# Patient Record
Sex: MALE | Race: BLACK OR AFRICAN AMERICAN
[De-identification: names, ages, dates, MRNs, and addresses within clinical notes are randomized per-mention and may not be internally consistent; named-entity substitution may affect disease eponyms.]

---

## 2017-11-07 ENCOUNTER — HOSPITAL ENCOUNTER (OUTPATIENT)
Dept: HOSPITAL 61 - SURG | Age: 52
Discharge: HOME | End: 2017-11-07
Attending: INTERNAL MEDICINE
Payer: COMMERCIAL

## 2017-11-07 VITALS — SYSTOLIC BLOOD PRESSURE: 134 MMHG | DIASTOLIC BLOOD PRESSURE: 71 MMHG

## 2017-11-07 DIAGNOSIS — E78.00: ICD-10-CM

## 2017-11-07 DIAGNOSIS — Z12.11: Primary | ICD-10-CM

## 2017-11-07 DIAGNOSIS — Z86.39: ICD-10-CM

## 2017-11-07 DIAGNOSIS — Z91.018: ICD-10-CM

## 2017-11-07 DIAGNOSIS — I11.0: ICD-10-CM

## 2017-11-07 DIAGNOSIS — I10: ICD-10-CM

## 2017-11-07 DIAGNOSIS — E03.9: ICD-10-CM

## 2017-11-07 DIAGNOSIS — I50.9: ICD-10-CM

## 2017-11-07 DIAGNOSIS — E11.9: ICD-10-CM

## 2017-11-07 PROCEDURE — 45378 DIAGNOSTIC COLONOSCOPY: CPT

## 2017-11-07 NOTE — PDOC1
HISTORY & PHYSICAL


H&P


Pravin Rodriguez Jr [id number] 1965 10/18/2017 03:00 PM 1/1 


 [place] [facility], [facility]


 OUR PATIENTS COME FIRST


 [address] [place] [address]   


 Ph. [phone]     








Patient:      Pravin Rodriguez Jr


YOB: 1965   


Date:                         10/18/2017 3:00 PM 


Visit Type:                 Consult





This 52 year old male presents for Screening colonoscopy.





History of Present Illness:


1.  Screening colonoscopy 


No prior screening.  Denies risk factors.  Pertinent negatives include 

abdominal pain, change in bowel habits, change in stool caliber, constipation, 

decreased appetite, diarrhea, melena, nausea, rectal bleeding, vomiting, weight 

gain and weight loss.  Additional information: No family history of colon cancer

, No family history of Crohn's/colitis and No NSAID/ASA use.





PROBLEM LIST:











Problem Description Onset Date  


 


Full examination performed 04/04/2014  


 


Type II diabetes mellitus well controlled 02/21/2012  


 


Congestive heart failure 10/19/2009  


 


Benign essential hypertension 10/19/2009  


 


Hyperlipidemia 10/19/2009  


 


Conduction disorder of the heart 10/19/2009  


 


Hypothyroidism 04/04/2014  








PAST MEDICAL/SURGICAL HISTORY   (Detailed)











Disease/disorder Onset Date Management Date Comments


 


AICD 2009 cardiomyopathy  


 


Congestive heart failure 2009 AICD placement  


 


Diabetes    


 


hernia surgery 2011   


 


Hyperlipidemia    


 


Hypertension    


 


Sleep apnea 2009 CPAP mask  








Medications (Active):











Started Medication Directions Instruction Stopped


 


09/18/2017 Aldactone 25 mg tablet TAKE 1 TABLET BY MOUTH DAILY  


 


09/18/2017 Coreg 25 mg tablet TAKE 1 TABLET BY MOUTH 2 TIMES A DAY WITH FOOD  


 


09/18/2017 Demadex 20 mg tablet take 1 tablet by oral route  every day  


 


09/18/2017 hydrochlorothiazide 25 mg tablet TAKE 1 TABLET BY MOUTH DAILY  


 


09/18/2017 losartan 50 mg tablet take 1 tablet by oral route  every day  


 


09/18/2017 Synthroid 175 mcg tablet TAKE ONE TABLET BY MOUTH ONCE DAILY  








Allergies:











Ingredient Reaction Medication Name Comment


 


LISINOPRIL   tongue tingling











REVIEW OF SYSTEMS











System Neg/Pos Details


 


Constitutional Negative Chills, fever, malaise, weight gain and weight loss.


 


ENMT Negative Sore throat.


 


Eyes Negative Double vision.


 


Respiratory Negative Dyspnea and wheezing.


 


Cardio Negative Chest pain and irregular heartbeat/palpitations.


 


GI Positive See HPI.


 


GI Negative Abdominal pain, change in bowel habits, change in stool caliber, 

constipation, decreased appetite, diarrhea, melena, nausea, see HPI, rectal 

bleeding and vomiting.


 


 Negative Dysuria and hematuria.


 


Endocrine Negative Cold intolerance and heat intolerance.


 


Psych Negative Anxiety.


 


Integumentary Negative Hives and rash.


 


MS Negative Joint pain.


 


Hema/Lymph Negative Easy bleeding and easy bruising.


 


Allergic/Immuno Negative Food allergies.








VITAL SIGNS 











Time BP mm/Hg Pulse /min Resp /min Temp F Ht ft Ht in Ht cm Wt lb Wt kg BMI kg/

m2 BSA m2 O2 Sat%


 


2:48 /110 78  98.1 5.0 9.00 175.26 324.40 147.145 47.90  98














Time Measured by


 


2:48 PM Aylin Price








PHYSICAL EXAM:











Exam Findings Details


 


Constitutional Normal Well developed.


 


Eyes Normal Conjunctiva - Right: Normal, Left: Normal. Sclera - Right: Normal, 

Left: Normal.


 


Nasopharynx Normal Lips/teeth/gums - Normal.


 


Neck Exam Normal Inspection - Normal. Thyroid gland - Normal.


 


Respiratory Normal Inspection - Normal. Auscultation - Normal.


 


Cardiovascular Normal Regular rate and rhythm. No murmurs, gallops, or rubs.


 


Vascular Normal Pulses - Carotids: Normal, Femoral: Normal, Dorsalis pedis: 

Normal.


 


Abdomen Normal Inspection - Normal. Anterior palpation - No guarding. No 

abdominal tenderness. No hepatic enlargement. No splenic enlargement. No 

hernia. No ascites.


 


Skin Normal Inspection - Normal.


 


Extremity Normal No edema.


 


Psychiatric * Oriented to time, place, person and situation.


 


Psychiatric Normal Appropriate mood and effect.








Assessment/Plan











# Detail Type Description


 


 1. Assessment Encounter for screening colonoscopy (Z12.11).


 


 Patient Plan  schedule colonoscopy at [facility]


 


 Plan Orders Further diagnostic evaluations ordered today include(s) 

Colonoscopy to be performed today.  He is to schedule a follow-up visit with 

Darren Willams MD upon completion of work-up








Electronically signed by:  Darren Willams MD  10/18/2017 03:15 PM


Document generated by:  Darren Willams 10/18/2017 03:15 PM


                      Mara Grimes MD, Family Practice;  Bennett Wade MD 

Internal Medicine; Jermaine Yousif MD, Internal Medicine; 


 Irina Willams MD Internal Medicine; Darren Willams MD, Gastroenterology; 


  Darryl Betancur MD, Rheumatology, S. Larry Sifuentes, Physical Medicine/[facility] 


 [facility]








--------------------------------------------------------------------------------

--------------------------------------------------------------------------------

------








11/7/17





Patient seen and examined. No change in H&P.











DARREN WILLAMS MD Nov 7, 2017 08:26

## 2019-10-06 ENCOUNTER — HOSPITAL ENCOUNTER (EMERGENCY)
Dept: HOSPITAL 61 - ER | Age: 54
Discharge: HOME | End: 2019-10-06
Payer: SELF-PAY

## 2019-10-06 VITALS — BODY MASS INDEX: 43.4 KG/M2 | HEIGHT: 71 IN | WEIGHT: 310 LBS

## 2019-10-06 VITALS
DIASTOLIC BLOOD PRESSURE: 83 MMHG | DIASTOLIC BLOOD PRESSURE: 83 MMHG | SYSTOLIC BLOOD PRESSURE: 138 MMHG | SYSTOLIC BLOOD PRESSURE: 138 MMHG

## 2019-10-06 DIAGNOSIS — S51.012A: Primary | ICD-10-CM
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
## 2019-10-06 DIAGNOSIS — Z91.018: ICD-10-CM
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
## 2019-10-06 DIAGNOSIS — I50.9: ICD-10-CM

## 2019-10-06 DIAGNOSIS — E11.9: ICD-10-CM

## 2019-10-06 PROCEDURE — 99283 EMERGENCY DEPT VISIT LOW MDM: CPT

## 2019-10-06 PROCEDURE — 90471 IMMUNIZATION ADMIN: CPT

## 2019-10-06 PROCEDURE — 90715 TDAP VACCINE 7 YRS/> IM: CPT

## 2019-10-06 PROCEDURE — 12001 RPR S/N/AX/GEN/TRNK 2.5CM/<: CPT

## 2019-10-06 NOTE — PHYS DOC
Past Medical History


Past Medical History:  CHF, Diabetes-Type II


Alcohol Use:  None


Drug Use:  None





Adult General


Chief Complaint


Chief Complaint:  LACERATION/AVULSION





[facility]





Patient is a 54  year old AA male who presents to the emergency room with 

complaints of a laceration to his left elbow. Patient states that he hit his 

elbow on a piece of tile while in the shower today. He denies any decreased 

range of motion, numbness, tingling, weakness, or pain at this time. The patient

is unsure when his last tetanus shot was. He currently he rates his pain a 0 out

of 10 on the pain scale. All other ROS is negative unless otherwise documented 

in HPI.





Review of Systems


Review of Systems


See Above





Current Medications


Current Medications





Current Medications








 Medications


  (Trade)  Dose


 Ordered  Sig/Jakob  Start Time


 Stop Time Status Last Admin


Dose Admin


 


 Diphtheria/


 Tetanus/Acell


 Pertussis


  (Boostrix)  0.5 ml  ONCE ONCE  10/6/19 12:00


 10/6/19 12:01 DC 10/6/19 12:08


0.5 ML


 


 Lidocaine/


 Epinephrine


  (LIDOCAINE


 1%-EPI 1:100,000


 Multi-Dose)  20 ml  1X  ONCE  10/6/19 12:00


 10/6/19 12:01 DC 10/6/19 12:05


20 ML


 


 Neomycin/


 Polymyxin/


 Bacitracin


  (Triple


 Antibiotic


 Ointment)  1 pkt  1X  ONCE  10/6/19 12:00


 10/6/19 12:01 DC 10/6/19 12:05


1 PKT











Allergies


Allergies





Allergies








Coded Allergies Type Severity Reaction Last Updated Verified


 


  walnut Allergy Intermediate Hives 11/7/17 Yes











Physical Exam


Physical Exam


See Above


Constitutional: Well developed, well nourished, no acute distress, non-toxic 

appearance, obese. []


HENT: Normocephalic, atraumatic, bilateral external ears normal, nose normal. []


Eyes: PERRLA, EOMI, conjunctiva normal, no discharge. [] 


Neck: Normal range of motion, no stridor. [] 


Cardiovascular:Heart rate regular rhythm


Lungs & Thorax:  respirations even and unlabored, no retractions, no distress]


Skin: Warm, dry, no erythema, no rash; 2 cm laceration noted to the left lateral

elbow, bleeding controlled with bandage in place [] 


Extremities: L elbow: No bony tenderness, no cyanosis, no clubbing, ROM intact, 

no edema. [] 


Neurologic: Alert and oriented X 3, normal motor function, normal sensory functi

on, no focal deficits noted. []


Psychologic: Affect normal, judgement normal, mood normal. []





Current Patient Data


Vital Signs





                                   Vital Signs








  Date Time  Temp Pulse Resp B/P (MAP) Pulse Ox O2 Delivery O2 Flow Rate FiO2


 


10/6/19 11:10 98.2 97 15 138/83 (101) 98 Room Air  





 98.2       











EKG


EKG


[]





Radiology/Procedures


Radiology/Procedures


Laceration Repair by me:


Anesthesia:         1% lidocaine locally


Location:         left elbow


Tendon/Joint/Nerves:      No injury


Foreign body:      None detected after copious irrigation and exploration


Technique:         4 Simple Interrupted Sutures with 4-0 ethilon


Complexity:         No subcutaneous sutures/mucosal repair/edge excision


Post Closure Length:      2 cm





Patient's bleeding was easily controlled in the department and there is no 

indication of anemia.


No evidence of compartment syndrome, neurologic injury, vascular injury, open 

joint, tendon laceration, or foreign body.


Patient is appropriate for outpatient follow up.





Course & Med Decision Making


Course & Med Decision Making


Pertinent Labs and Imaging studies reviewed. (See chart for details)


Patient is a 54-year-old male who presents to the emergency room with complaints

 of left elbow laceration. The laceration was repaired as documented above and 

the patient's tetanus was updated while in the department. Patient was 

instructed to keep the area clean and dry, take Tylenol or ibuprofen as needed 

for pain. Leave the dressing that was placed today on for 24 hours then change 

the dressing twice a day and apply antibiotic ointment to the area. Follow-up 

with your primary care doctor, or return to the emergency room in 14 days to 

have the sutures removed, sooner if you develop signs of infection including: 

redness, warmth, drainage, or a fever. 





Pt verbalized an understanding of discharge, medications, follow-up, home care, 

and return to ED precautions, was in agreement with POC.





Dragon Disclaimer


Dragon Disclaimer


This electronic medical record was generated, in whole or in part, using a voice

 recognition dictation system.





Departure


Departure


Impression:  


   Primary Impression:  


   Laceration of left elbow without complication


   Additional Impression:  


   Need for DTaP vaccination


Disposition:  01 HOME, SELF-CARE


Condition:  STABLE


Referrals:  


SONIA OSORIO MD (PCP)


Patient Instructions:  Laceration Care, Adult, Easy-to-Read





Additional Instructions:  


Keep the area clean and dry. You may take Tylenol or ibuprofen as needed for 

pain. Keep the dressing that was placed today on for 24 hours then change the 

dressing twice a day and apply antibiotic ointment to the area. Follow-up with 

your primary care doctor, or return to the emergency room in 14 days to have the

 sutures removed, sooner if you develop signs of infection including: redness, 

warmth, drainage, or a fever.





Problem Qualifiers








   Primary Impression:  


   Laceration of left elbow without complication


   Encounter type:  initial encounter  Qualified Codes:  S51.012A - Laceration 

   without foreign body of left elbow, initial encounter








TIAGO MEJIAS APRN        Oct 6, 2019 13:37

## 2020-07-23 ENCOUNTER — HOSPITAL ENCOUNTER (OUTPATIENT)
Dept: HOSPITAL 61 - KCIC | Age: 55
Discharge: HOME | End: 2020-07-23
Attending: INTERNAL MEDICINE
Payer: SELF-PAY

## 2020-07-23 DIAGNOSIS — M47.814: ICD-10-CM

## 2020-07-23 DIAGNOSIS — Z95.0: ICD-10-CM

## 2020-07-23 DIAGNOSIS — J18.9: Primary | ICD-10-CM

## 2020-07-23 DIAGNOSIS — Q25.46: ICD-10-CM

## 2020-07-23 DIAGNOSIS — I51.7: ICD-10-CM

## 2020-07-23 PROCEDURE — 71046 X-RAY EXAM CHEST 2 VIEWS: CPT

## 2020-07-23 NOTE — KCIC
PA and lateral chest radiographs 7/23/2020

 

CLINICAL HISTORY: History of pneumonia. Sputum productive cough.

 

PA and lateral digital radiograph of the chest were obtained. Comparison 

study is dated 11/20/2009. A left-sided pacemaker is unchanged. The 

cardiac silhouette is mildly enlarged. The thoracic aorta is mildly 

tortuous. No acute pulmonary infiltrate is seen. No pleural effusion or 

pneumothorax is noted. Degenerative changes are seen involving the 

thoracic spine.

 

IMPRESSION: No acute abnormality is seen.

 

Electronically signed by: Braden Massey MD (7/23/2020 4:52 PM) [id number]